# Patient Record
Sex: MALE | Race: WHITE | NOT HISPANIC OR LATINO | URBAN - METROPOLITAN AREA
[De-identification: names, ages, dates, MRNs, and addresses within clinical notes are randomized per-mention and may not be internally consistent; named-entity substitution may affect disease eponyms.]

---

## 2024-05-17 ENCOUNTER — APPOINTMENT (OUTPATIENT)
Dept: PRIMARY CARE | Facility: CLINIC | Age: 39
End: 2024-05-17
Payer: COMMERCIAL

## 2024-05-28 ENCOUNTER — APPOINTMENT (OUTPATIENT)
Dept: PRIMARY CARE | Facility: CLINIC | Age: 39
End: 2024-05-28
Payer: COMMERCIAL

## 2024-06-05 ENCOUNTER — TELEPHONE (OUTPATIENT)
Dept: PRIMARY CARE | Facility: CLINIC | Age: 39
End: 2024-06-05
Payer: COMMERCIAL

## 2024-06-05 NOTE — PROGRESS NOTES
Subjective   Aldair Lai is a 38 y.o. male who presents for SICK VISIT FOR KNEE PAIN (PCP: Dr. Reyna).     HPI:      38 y.o. male who presents for SICK VISIT FOR KNEE PAIN (PCP: Dr. Reyna).       Today  Aldair reports:    -  intermittent right knee pain, rated in severity as 5/10, not worsening over time. He denies fevers/chills, rash, swollen or red knee joint or other joints, history of knee injury, or family history of RA or rheumatologic disorders.    -intermittent heart palpitations and rapid heart racing heart  twice in the past 6 months. He denies chest pain, N/V, SOB, cough, exertional dyspnea, LE swelling, family history heart attack or stroke.     -otherwise doing and feeling well.     -taking all medications as prescribed with no reported adverse medication side effects      Today he has no other reported complaints, issues, or problems.    ROS is NEG for HEADACHE, NAUSEA, VOMITING, DIARRHEA, CHEST PAIN, SOB, and BLEEDING.  Review of systems (10+) is negative for all systems except for any identified issues in HPI above.      IMMUNIZATIONS:  TDAP: NOW DUE  Immunization History   Administered Date(s) Administered    Pfizer Purple Cap SARS-CoV-2 02/11/2021, 03/17/2021, 11/12/2021         Objective     BP (!) 154/98   Pulse 80   Temp 37.1 °C (98.7 °F)   Wt 95.1 kg (209 lb 9.6 oz)   SpO2 98%      Physical Examination:       GENERAL           General Appearance: well-appearing, well-developed, well-hydrated, well-nourished, no acute distress.        HEENT           NECK supple, no masses or thyromegaly, no carotid bruit.        EYES           Extraocular Movements: normal, bilateral eyes YOLETTE, no conjunctival injection.        HEART           Rate and Rhythm regular rate and rhythm. Heart sounds: normal S1S2, no S3 or S4. Murmurs: none.        CHEST           Breath sounds: Clear to IPPA, RR<16 no use of accessory muscles.        ABDOMEN           General: Neg for LKKS or masses, no scleral icterus or  jaundice.        MUSCULOSKELETAL           Joints Demonstration: Neg for erythema, swelling or joint deformities. gross abnormalities no gross abnormalities. RIGHT KNEE: non-tender to palpation, no erythema, no swelling. Full ROM. Negative Lachman's and anterior drawer.       EXTREMITIES           Lower Extremities: Neg for cyanosis, clubbing or edema.       Assessment/Plan   Problem List Items Addressed This Visit    None  Visit Diagnoses       Knee pain, unspecified chronicity, unspecified laterality    -  Primary    Encounter for immunization              RIGHT Knee pain: no red flag signs/sxs: r/o structural and rheumatologic disorder  -RIGHT knee XR ordered  -ESR, CRP, uric acid, CAROLEE, RF ordered  -referral to PT ordered  -referral to orthopedic surgery ordered  -emergency Dept precautions discussed and reviewed with patient    Intermittent heart palpitations: no red flag symptoms. R/o cardiac etiology  -TSH, CBC, CMP ordered  -referral to cardiology  -Emergency Room as 911/EMS precautions discussed and reviewed    UNCONTROLLED HTN: 154/98==> 150/98 manual repeat ; asymptomatic today  -low salt diet, regularly exercise, and limit alcohol intake  -start amlodipine 5 mg daily in the morning  -Emergency Room as 911/EMS precautions discussed and reviewed      Immunization counseling:  -TDAP now due: declined    FOLLOW UP : 1 week for BP check and Dr. Reyna in 6-8 weeks       Payton Forde MD, PhD

## 2024-06-05 NOTE — TELEPHONE ENCOUNTER
Pt lvm stating the knee pain is more secondary. Pt states he is being seen more about potential anxiety

## 2024-06-06 ENCOUNTER — OFFICE VISIT (OUTPATIENT)
Dept: PRIMARY CARE | Facility: CLINIC | Age: 39
End: 2024-06-06
Payer: COMMERCIAL

## 2024-06-06 VITALS
DIASTOLIC BLOOD PRESSURE: 98 MMHG | SYSTOLIC BLOOD PRESSURE: 150 MMHG | HEART RATE: 80 BPM | TEMPERATURE: 98.7 F | OXYGEN SATURATION: 98 % | WEIGHT: 209.6 LBS

## 2024-06-06 DIAGNOSIS — Z23 ENCOUNTER FOR IMMUNIZATION: ICD-10-CM

## 2024-06-06 DIAGNOSIS — R00.2 HEART PALPITATIONS: ICD-10-CM

## 2024-06-06 DIAGNOSIS — I10 PRIMARY HYPERTENSION: ICD-10-CM

## 2024-06-06 DIAGNOSIS — M25.569 KNEE PAIN, UNSPECIFIED CHRONICITY, UNSPECIFIED LATERALITY: Primary | ICD-10-CM

## 2024-06-06 PROCEDURE — 3077F SYST BP >= 140 MM HG: CPT | Performed by: FAMILY MEDICINE

## 2024-06-06 PROCEDURE — 3080F DIAST BP >= 90 MM HG: CPT | Performed by: FAMILY MEDICINE

## 2024-06-06 PROCEDURE — 1036F TOBACCO NON-USER: CPT | Performed by: FAMILY MEDICINE

## 2024-06-06 PROCEDURE — 99214 OFFICE O/P EST MOD 30 MIN: CPT | Performed by: FAMILY MEDICINE

## 2024-06-06 RX ORDER — AMLODIPINE BESYLATE 5 MG/1
5 TABLET ORAL DAILY
Qty: 30 TABLET | Refills: 11 | Status: SHIPPED | OUTPATIENT
Start: 2024-06-06 | End: 2025-06-06

## 2024-06-06 ASSESSMENT — PATIENT HEALTH QUESTIONNAIRE - PHQ9
1. LITTLE INTEREST OR PLEASURE IN DOING THINGS: NOT AT ALL
2. FEELING DOWN, DEPRESSED OR HOPELESS: NOT AT ALL
SUM OF ALL RESPONSES TO PHQ9 QUESTIONS 1 AND 2: 0

## 2024-06-06 ASSESSMENT — COLUMBIA-SUICIDE SEVERITY RATING SCALE - C-SSRS
2. HAVE YOU ACTUALLY HAD ANY THOUGHTS OF KILLING YOURSELF?: NO
6. HAVE YOU EVER DONE ANYTHING, STARTED TO DO ANYTHING, OR PREPARED TO DO ANYTHING TO END YOUR LIFE?: NO
1. IN THE PAST MONTH, HAVE YOU WISHED YOU WERE DEAD OR WISHED YOU COULD GO TO SLEEP AND NOT WAKE UP?: NO

## 2024-06-06 ASSESSMENT — ANXIETY QUESTIONNAIRES
6. BECOMING EASILY ANNOYED OR IRRITABLE: NOT AT ALL
2. NOT BEING ABLE TO STOP OR CONTROL WORRYING: SEVERAL DAYS
3. WORRYING TOO MUCH ABOUT DIFFERENT THINGS: SEVERAL DAYS
7. FEELING AFRAID AS IF SOMETHING AWFUL MIGHT HAPPEN: NOT AT ALL
IF YOU CHECKED OFF ANY PROBLEMS ON THIS QUESTIONNAIRE, HOW DIFFICULT HAVE THESE PROBLEMS MADE IT FOR YOU TO DO YOUR WORK, TAKE CARE OF THINGS AT HOME, OR GET ALONG WITH OTHER PEOPLE: SOMEWHAT DIFFICULT
1. FEELING NERVOUS, ANXIOUS, OR ON EDGE: SEVERAL DAYS
GAD7 TOTAL SCORE: 5
5. BEING SO RESTLESS THAT IT IS HARD TO SIT STILL: SEVERAL DAYS
4. TROUBLE RELAXING: SEVERAL DAYS

## 2024-06-06 ASSESSMENT — PAIN SCALES - GENERAL: PAINLEVEL: 2

## 2024-06-06 NOTE — PATIENT INSTRUCTIONS
Please start amlodipine 5 mg daily in morning and follow a low salt diet, regularly exercise, and limit alcohol intake.    Please have your labs drawn at [facility], [place]. You can walk into radiology to complete knee xrays    Please call and schedule to see cardiology Dr. Vallabehini    Please schedule to see in 1 week for blood pressure check    If you develop chest pain, heart palpitations, or pass out immediately call [phone]

## 2024-06-10 ENCOUNTER — LAB (OUTPATIENT)
Dept: LAB | Facility: LAB | Age: 39
End: 2024-06-10
Payer: COMMERCIAL

## 2024-06-10 DIAGNOSIS — R00.2 HEART PALPITATIONS: ICD-10-CM

## 2024-06-10 DIAGNOSIS — M25.569 KNEE PAIN, UNSPECIFIED CHRONICITY, UNSPECIFIED LATERALITY: ICD-10-CM

## 2024-06-10 LAB
ALBUMIN SERPL-MCNC: 4.8 G/DL (ref 3.5–5)
ALP BLD-CCNC: 64 U/L (ref 35–125)
ALT SERPL-CCNC: 14 U/L (ref 5–40)
ANION GAP SERPL CALC-SCNC: 18 MMOL/L
AST SERPL-CCNC: 15 U/L (ref 5–40)
BASOPHILS # BLD AUTO: 0.06 X10*3/UL (ref 0–0.1)
BASOPHILS NFR BLD AUTO: 0.7 %
BILIRUB SERPL-MCNC: 1.1 MG/DL (ref 0.1–1.2)
BUN SERPL-MCNC: 11 MG/DL (ref 8–25)
CALCIUM SERPL-MCNC: 9.8 MG/DL (ref 8.5–10.4)
CHLORIDE SERPL-SCNC: 103 MMOL/L (ref 97–107)
CO2 SERPL-SCNC: 23 MMOL/L (ref 24–31)
CREAT SERPL-MCNC: 0.9 MG/DL (ref 0.4–1.6)
CRP SERPL-MCNC: <0.3 MG/DL (ref 0–2)
EGFRCR SERPLBLD CKD-EPI 2021: >90 ML/MIN/1.73M*2
EOSINOPHIL # BLD AUTO: 0.06 X10*3/UL (ref 0–0.7)
EOSINOPHIL NFR BLD AUTO: 0.7 %
ERYTHROCYTE [DISTWIDTH] IN BLOOD BY AUTOMATED COUNT: 12.9 % (ref 11.5–14.5)
ERYTHROCYTE [SEDIMENTATION RATE] IN BLOOD BY WESTERGREN METHOD: 4 MM/H (ref 0–15)
GLUCOSE SERPL-MCNC: 90 MG/DL (ref 65–99)
HCT VFR BLD AUTO: 42.3 % (ref 41–52)
HGB BLD-MCNC: 14 G/DL (ref 13.5–17.5)
IMM GRANULOCYTES # BLD AUTO: 0.02 X10*3/UL (ref 0–0.7)
IMM GRANULOCYTES NFR BLD AUTO: 0.2 % (ref 0–0.9)
LYMPHOCYTES # BLD AUTO: 3.01 X10*3/UL (ref 1.2–4.8)
LYMPHOCYTES NFR BLD AUTO: 35.4 %
MCH RBC QN AUTO: 28.6 PG (ref 26–34)
MCHC RBC AUTO-ENTMCNC: 33.1 G/DL (ref 32–36)
MCV RBC AUTO: 87 FL (ref 80–100)
MONOCYTES # BLD AUTO: 0.59 X10*3/UL (ref 0.1–1)
MONOCYTES NFR BLD AUTO: 6.9 %
NEUTROPHILS # BLD AUTO: 4.77 X10*3/UL (ref 1.2–7.7)
NEUTROPHILS NFR BLD AUTO: 56.1 %
NRBC BLD-RTO: 0 /100 WBCS (ref 0–0)
PLATELET # BLD AUTO: 281 X10*3/UL (ref 150–450)
POTASSIUM SERPL-SCNC: 4 MMOL/L (ref 3.4–5.1)
PROT SERPL-MCNC: 7.5 G/DL (ref 5.9–7.9)
RBC # BLD AUTO: 4.89 X10*6/UL (ref 4.5–5.9)
SODIUM SERPL-SCNC: 144 MMOL/L (ref 133–145)
TSH SERPL DL<=0.05 MIU/L-ACNC: 2.7 MIU/L (ref 0.27–4.2)
URATE SERPL-MCNC: 5 MG/DL (ref 3.6–7.7)
WBC # BLD AUTO: 8.5 X10*3/UL (ref 4.4–11.3)

## 2024-06-10 PROCEDURE — 36415 COLL VENOUS BLD VENIPUNCTURE: CPT

## 2024-06-10 PROCEDURE — 86038 ANTINUCLEAR ANTIBODIES: CPT

## 2024-06-10 PROCEDURE — 85025 COMPLETE CBC W/AUTO DIFF WBC: CPT

## 2024-06-10 PROCEDURE — 85652 RBC SED RATE AUTOMATED: CPT

## 2024-06-10 PROCEDURE — 84443 ASSAY THYROID STIM HORMONE: CPT

## 2024-06-10 PROCEDURE — 84550 ASSAY OF BLOOD/URIC ACID: CPT

## 2024-06-10 PROCEDURE — 80053 COMPREHEN METABOLIC PANEL: CPT

## 2024-06-10 PROCEDURE — 86140 C-REACTIVE PROTEIN: CPT

## 2024-06-10 PROCEDURE — 86431 RHEUMATOID FACTOR QUANT: CPT

## 2024-06-11 LAB — RHEUMATOID FACT SER NEPH-ACNC: <10 IU/ML (ref 0–15)

## 2024-06-12 LAB — ANA SER QL HEP2 SUBST: NEGATIVE

## 2024-06-19 NOTE — PATIENT INSTRUCTIONS
Please increase amlodipine from 5 mg to 10 mg daily in morning and follow a low salt diet, regularly exercise, and limit alcohol intake.     You can walk into radiology to complete knee xrays     If you develop chest pain, heart palpitations, or pass out immediately call 911    Please schedule to see  me in 1 week for BP check and Dr. Reyna in 6-8 weeks for follow up

## 2024-06-19 NOTE — PROGRESS NOTES
Subjective   Aldair Lai is a 38 y.o. male who presents for HTN and BP CHECK (PCP: Dr. Reyna).    HPI:    38 y.o. male who presents for HTN and BP CHECK  (PCP: Dr. Reyna)..     EMR/[facility] records reviewed.    Last seen by me 6/6/24 for SICK VISIT for RIGHT KNEE PAIN. At visit:    UNCONTROLLED HTN: 154/98==> 150/98 manual repeat ; asymptomatic today  -low salt diet, regularly exercise, and limit alcohol intake  -start amlodipine 5 mg daily in the morning  -Emergency Room as 911/EMS precautions discussed and reviewed    RIGHT Knee pain: no red flag signs/sxs: r/o structural and rheumatologic disorder  -RIGHT knee XR ordered  -ESR, CRP, uric acid, CAROLEE, RF ordered  -referral to PT ordered  -referral to orthopedic surgery ordered  -emergency Dept precautions discussed and reviewed with patient     Intermittent heart palpitations: no red flag symptoms. R/o cardiac etiology  -TSH, CBC, CMP ordered  -referral to cardiology  -Emergency Room as 911/EMS precautions discussed and reviewed       Immunization counseling:  -TDAP now due: declined     FOLLOW UP : 1 week for BP check and Dr. Reyna in 6-8 weeks      PMHx:  HTN; on amlodipine 5 mg daily    Immunization History   Administered Date(s) Administered    Pfizer Purple Cap SARS-CoV-2 02/11/2021, 03/17/2021, 11/12/2021           INTERVAL HISTORY:    -has not yet completed knee xray    -completed labs. Based on test results:  -normal glucose, electrolytes, kidney and liver function     -CRP, ESR, rheumatoid factor, uric acid normal     -thyroid function normal     -blood counts normal      Today  Aldair reports:     -  intermittent right knee pain, rated in severity as 5/10, not worsening over time, UNCHANGED SINCE LAST VISIT. He denies fevers/chills, rash, swollen or red knee joint or other joints, history of knee injury, or family history of RA or rheumatologic disorders.     -intermittent heart palpitations and rapid heart racing heart  twice in the past 6 months, with no  "new episodes since last visit. He denies chest pain, N/V, SOB, cough, exertional dyspnea, LE swelling, family history heart attack or stroke.     -has scheduled cardiology appt 7/23/24     -otherwise doing and feeling well.      -taking all medications as prescribed, including amlodipine 5 mg daily, with no reported adverse medication side effects    -following a low salt diet        Today he has no other reported complaints, issues, or problems.     ROS is NEG for HEADACHE, NAUSEA, VOMITING, DIARRHEA, CHEST PAIN, SOB, and BLEEDING.  Review of systems (10+) is negative for all systems except for any identified issues in HPI above.         Objective     /90   Pulse 80   Temp 36.9 °C (98.4 °F)   Ht 1.854 m (6' 1\")   Wt 92.7 kg (204 lb 6.4 oz)   SpO2 97%   BMI 26.97 kg/m²      Physical Examination:       GENERAL           General Appearance: well-appearing, well-developed, well-hydrated, well-nourished, no acute distress.        HEENT           NECK supple, no masses or thyromegaly, no carotid bruit.        EYES           Extraocular Movements: normal, bilateral eyes YOLETTE, no conjunctival injection.        HEART           Rate and Rhythm regular rate and rhythm. Heart sounds: normal S1S2, no S3 or S4. Murmurs: none.        CHEST           Breath sounds: Clear to IPPA, RR<16 no use of accessory muscles.        ABDOMEN           General: Neg for LKKS or masses, no scleral icterus or jaundice.        MUSCULOSKELETAL           Joints Demonstration: Neg for erythema, swelling or joint deformities. gross abnormalities no gross abnormalities.        EXTREMITIES           Lower Extremities: Neg for cyanosis, clubbing or edema.       Assessment/Plan   Problem List Items Addressed This Visit    None  Visit Diagnoses       Primary hypertension    -  Primary    Relevant Medications    amLODIPine (Norvasc) 10 mg tablet    Heart palpitations        Knee pain, unspecified chronicity, unspecified laterality          "     HTN:  uncontrolled, asymptomatic   -low salt diet, regularly exercise, and limit alcohol intake  -increase amlodipine from 5 mg to 10 mg daily in the morning  -Emergency Room as 911/EMS precautions discussed and reviewed    RIGHT Knee pain: no red flag signs/sxs: ESR, CRP, uric acid, CAROLEE, RF WNL 6/10/24. r/o structural and rheumatologic disorder  -RIGHT knee XR ordered 6/6/24  -referral to PT ordered 6/6/24  -referral to orthopedic surgery ordered 6/6/24  -emergency Dept precautions discussed and reviewed with patient     Intermittent heart palpitations: no red flag symptoms. CBC, CMP, TSH WNL 6/10/24. R/o cardiac etiology  -referral to cardiology ordered 6/6/24; has scheduled cardiology appt  -Emergency Room as 911/EMS precautions discussed and reviewed        Immunization counseling:  -TDAP now due: declined     FOLLOW UP : 1 week for BP check and Dr. Reyna in 6-8 weeks         Payton Forde MD, PhD

## 2024-06-20 ENCOUNTER — OFFICE VISIT (OUTPATIENT)
Dept: PRIMARY CARE | Facility: CLINIC | Age: 39
End: 2024-06-20
Payer: COMMERCIAL

## 2024-06-20 VITALS
DIASTOLIC BLOOD PRESSURE: 90 MMHG | HEIGHT: 73 IN | HEART RATE: 80 BPM | OXYGEN SATURATION: 97 % | BODY MASS INDEX: 27.09 KG/M2 | TEMPERATURE: 98.4 F | SYSTOLIC BLOOD PRESSURE: 146 MMHG | WEIGHT: 204.4 LBS

## 2024-06-20 DIAGNOSIS — M25.569 KNEE PAIN, UNSPECIFIED CHRONICITY, UNSPECIFIED LATERALITY: ICD-10-CM

## 2024-06-20 DIAGNOSIS — I10 PRIMARY HYPERTENSION: Primary | ICD-10-CM

## 2024-06-20 DIAGNOSIS — R00.2 HEART PALPITATIONS: ICD-10-CM

## 2024-06-20 PROCEDURE — 3077F SYST BP >= 140 MM HG: CPT | Performed by: FAMILY MEDICINE

## 2024-06-20 PROCEDURE — 1036F TOBACCO NON-USER: CPT | Performed by: FAMILY MEDICINE

## 2024-06-20 PROCEDURE — 3080F DIAST BP >= 90 MM HG: CPT | Performed by: FAMILY MEDICINE

## 2024-06-20 PROCEDURE — 99214 OFFICE O/P EST MOD 30 MIN: CPT | Performed by: FAMILY MEDICINE

## 2024-06-20 RX ORDER — AMLODIPINE BESYLATE 10 MG/1
10 TABLET ORAL DAILY
Qty: 30 TABLET | Refills: 11 | Status: SHIPPED | OUTPATIENT
Start: 2024-06-20 | End: 2025-06-20

## 2024-06-20 ASSESSMENT — PAIN SCALES - GENERAL: PAINLEVEL: 0-NO PAIN

## 2024-06-20 ASSESSMENT — COLUMBIA-SUICIDE SEVERITY RATING SCALE - C-SSRS
2. HAVE YOU ACTUALLY HAD ANY THOUGHTS OF KILLING YOURSELF?: NO
1. IN THE PAST MONTH, HAVE YOU WISHED YOU WERE DEAD OR WISHED YOU COULD GO TO SLEEP AND NOT WAKE UP?: NO
6. HAVE YOU EVER DONE ANYTHING, STARTED TO DO ANYTHING, OR PREPARED TO DO ANYTHING TO END YOUR LIFE?: NO

## 2024-07-01 ENCOUNTER — APPOINTMENT (OUTPATIENT)
Dept: PRIMARY CARE | Facility: CLINIC | Age: 39
End: 2024-07-01
Payer: COMMERCIAL

## 2024-07-07 NOTE — PROGRESS NOTES
Subjective   Aldair Lai is a 38 y.o. male who presents for HTN and BP CHECK (PCP Dr. Reyna).    HPI:    38 y.o. male who presents for HTN and BP CHECK    [facility] EMR/EPIC records reviewed.    Last seen by me on 6/20/24 for  HTN and BP CHECK (PCP: Dr. Reyna). At visit:    HTN:  uncontrolled, asymptomatic   -low salt diet, regularly exercise, and limit alcohol intake  -increase amlodipine from 5 mg to 10 mg daily in the morning  -Emergency Room as 911/EMS precautions discussed and reviewed     RIGHT Knee pain: no red flag signs/sxs: ESR, CRP, uric acid, CAROLEE, RF WNL 6/10/24. r/o structural and rheumatologic disorder  -RIGHT knee XR ordered 6/6/24  -referral to PT ordered 6/6/24  -referral to orthopedic surgery ordered 6/6/24  -emergency Dept precautions discussed and reviewed with patient     Intermittent heart palpitations: no red flag symptoms. CBC, CMP, TSH WNL 6/10/24. R/o cardiac etiology  -referral to cardiology ordered 6/6/24; has scheduled cardiology appt  -Emergency Room as 911/EMS precautions discussed and reviewed        Immunization counseling:  -TDAP now due: declined     FOLLOW UP : 1 week for BP check and Dr. Reyna in 6-8 weeks        Also seen by me 6/6/24 for SICK VISIT for RIGHT KNEE PAIN. At visit:     UNCONTROLLED HTN: 154/98==> 150/98 manual repeat ; asymptomatic today  -low salt diet, regularly exercise, and limit alcohol intake  -start amlodipine 5 mg daily in the morning  -Emergency Room as 911/EMS precautions discussed and reviewed     RIGHT Knee pain: no red flag signs/sxs: r/o structural and rheumatologic disorder  -RIGHT knee XR ordered  -ESR, CRP, uric acid, CAROLEE, RF ordered  -referral to PT ordered  -referral to orthopedic surgery ordered  -emergency Dept precautions discussed and reviewed with patient     Intermittent heart palpitations: no red flag symptoms. R/o cardiac etiology  -TSH, CBC, CMP ordered  -referral to cardiology  -Emergency Room as 911/EMS precautions discussed and  reviewed        Immunization counseling:  -TDAP now due: declined     FOLLOW UP : 1 week for BP check and Dr. Reyna in 6-8 weeks        PMHx:  HTN; on amlodipine 10 mg daily     Immunization History   Administered Date(s) Administered    Pfizer Purple Cap SARS-CoV-2 02/11/2021, 03/17/2021, 11/12/2021                   INTERVAL HISTORY:     -has not yet completed RIGHT knee xray        Today  Aldair reports:        -intermittent heart palpitations and rapid heart racing heart  twice in the past 6 months, with no new episodes since last visit. He denies chest pain, N/V, SOB, cough, exertional dyspnea, LE swelling, family history heart attack or stroke.      -has scheduled cardiology appt 7/23/24    -  intermittent right knee pain, rated in severity as 5/10, not worsening over time, UNCHANGED SINCE LAST VISIT. He denies fevers/chills, rash, swollen or red knee joint or other joints, history of knee injury, or family history of RA or rheumatologic disorders.  He reports that he has not yet completed right knee XR but he will.      -otherwise doing and feeling well.      -taking all medications as prescribed, including amlodipine 10 mg daily, with no reported adverse medication side effects     -following a low salt diet        Today he has no other reported complaints, issues, or problems.     ROS is NEG for HEADACHE, NAUSEA, VOMITING, DIARRHEA, CHEST PAIN, SOB, and BLEEDING.  Review of systems (12 point) is negative for all systems except for any identified issues in HPI above.        Objective     There were no vitals taken for this visit.     Physical Examination:       GENERAL           General Appearance: well-appearing, well-developed, well-hydrated, well-nourished, no acute distress.        HEENT           NECK supple, no masses or thyromegaly, no carotid bruit.        EYES           Extraocular Movements: normal, bilateral eyes YOLETTE, no conjunctival injection.        HEART           Rate and Rhythm regular rate  and rhythm. Heart sounds: normal S1S2, no S3 or S4. Murmurs: none.        CHEST           Breath sounds: Clear to IPPA, RR<16 no use of accessory muscles.        ABDOMEN           General: Neg for LKKS or masses, no scleral icterus or jaundice.        MUSCULOSKELETAL           Joints Demonstration: Neg for erythema, swelling or joint deformities. gross abnormalities no gross abnormalities.        EXTREMITIES           Lower Extremities: Neg for cyanosis, clubbing or edema.       Assessment/Plan   Problem List Items Addressed This Visit    None    HTN:    -low salt diet, regularly exercise, and limit alcohol intake  -cont amlodipine from 10 mg daily in the morning  -Emergency Room as 911/EMS precautions discussed and reviewed     RIGHT Knee pain: no red flag signs/sxs: ESR, CRP, uric acid, CAROLEE, RF WNL 6/10/24. r/o structural and rheumatologic disorder  -RIGHT knee XR ordered 6/6/24  -referral to PT ordered 6/6/24  -referral to orthopedic surgery ordered 6/6/24  -emergency Dept precautions discussed and reviewed with patient     Intermittent heart palpitations: no red flag symptoms. CBC, CMP, TSH WNL 6/10/24. R/o cardiac etiology  -referral to cardiology ordered 6/6/24; has scheduled cardiology appt  -Emergency Room as 911/EMS precautions discussed and reviewed        Immunization counseling:  -TDAP now due: declined     FOLLOW UP : Dr. Reyna in 6-8 weeks         Payton Forde MD, PhD

## 2024-07-09 ENCOUNTER — APPOINTMENT (OUTPATIENT)
Dept: PRIMARY CARE | Facility: CLINIC | Age: 39
End: 2024-07-09
Payer: COMMERCIAL

## 2024-07-09 DIAGNOSIS — I10 PRIMARY HYPERTENSION: Primary | ICD-10-CM

## 2024-07-09 DIAGNOSIS — Z71.85 IMMUNIZATION COUNSELING: ICD-10-CM

## 2024-07-09 DIAGNOSIS — M25.569 KNEE PAIN, UNSPECIFIED CHRONICITY, UNSPECIFIED LATERALITY: ICD-10-CM

## 2024-07-09 DIAGNOSIS — R00.2 HEART PALPITATIONS: ICD-10-CM

## 2024-07-17 NOTE — PROGRESS NOTES
Subjective   Aldair Lai is a 38 y.o. male who presents for HTN and BP CHECK (PCP Dr. Reyna).    HPI:    38 y.o. male who presents for HTN and BP CHECK    [facility] EMR/EPIC records reviewed.    Last seen by me on 6/20/24 for  HTN and BP CHECK (PCP: Dr. Reyna). At visit:    HTN:  uncontrolled, asymptomatic   -low salt diet, regularly exercise, and limit alcohol intake  -increase amlodipine from 5 mg to 10 mg daily in the morning  -Emergency Room as 911/EMS precautions discussed and reviewed     RIGHT Knee pain: no red flag signs/sxs: ESR, CRP, uric acid, CAROLEE, RF WNL 6/10/24. r/o structural and rheumatologic disorder  -RIGHT knee XR ordered 6/6/24  -referral to PT ordered 6/6/24  -referral to orthopedic surgery ordered 6/6/24  -emergency Dept precautions discussed and reviewed with patient     Intermittent heart palpitations: no red flag symptoms. CBC, CMP, TSH WNL 6/10/24. R/o cardiac etiology  -referral to cardiology ordered 6/6/24; has scheduled cardiology appt  -Emergency Room as 911/EMS precautions discussed and reviewed        Immunization counseling:  -TDAP now due: declined     FOLLOW UP : 1 week for BP check and Dr. Reyna in 6-8 weeks        Also seen by me 6/6/24 for SICK VISIT for RIGHT KNEE PAIN. At visit:     UNCONTROLLED HTN: 154/98==> 150/98 manual repeat ; asymptomatic today  -low salt diet, regularly exercise, and limit alcohol intake  -start amlodipine 5 mg daily in the morning  -Emergency Room as 911/EMS precautions discussed and reviewed     RIGHT Knee pain: no red flag signs/sxs: r/o structural and rheumatologic disorder  -RIGHT knee XR ordered  -ESR, CRP, uric acid, CAROLEE, RF ordered  -referral to PT ordered  -referral to orthopedic surgery ordered  -emergency Dept precautions discussed and reviewed with patient     Intermittent heart palpitations: no red flag symptoms. R/o cardiac etiology  -TSH, CBC, CMP ordered  -referral to cardiology  -Emergency Room as 911/EMS precautions discussed and  reviewed        Immunization counseling:  -TDAP now due: declined     FOLLOW UP : 1 week for BP check and Dr. Reyna in 6-8 weeks        PMHx:  HTN; on amlodipine 10 mg daily     Immunization History   Administered Date(s) Administered    Pfizer Purple Cap SARS-CoV-2 02/11/2021, 03/17/2021, 11/12/2021             INTERVAL HISTORY:     -has not yet completed RIGHT knee xray        Today  Aldair reports:        -intermittent heart palpitations and rapid heart racing heart  twice in the past 6 months, with no new episodes since last visit. He denies chest pain, N/V, SOB, cough, exertional dyspnea, LE swelling, family history heart attack or stroke.      -has scheduled cardiology appt 7/23/24 for evaluation of heart palpitations    -  intermittent right knee pain, rated in severity as 5/10, not worsening over time, UNCHANGED SINCE LAST VISIT. He denies fevers/chills, rash, swollen or red knee joint or other joints, history of knee injury, or family history of RA or rheumatologic disorders.  He reports that he has not yet completed right knee XR but he will.      -otherwise doing and feeling well.      -taking all medications as prescribed, including amlodipine 10 mg daily, with no reported adverse medication side effects     -following a low salt diet        Today he has no other reported complaints, issues, or problems.     ROS is NEG for HEADACHE, NAUSEA, VOMITING, DIARRHEA, CHEST PAIN, SOB, and BLEEDING.  Review of systems (12 point) is negative for all systems except for any identified issues in HPI above.        Objective     There were no vitals taken for this visit.     Physical Examination:       GENERAL           General Appearance: well-appearing, well-developed, well-hydrated, well-nourished, no acute distress.        HEENT           NECK supple, no masses or thyromegaly, no carotid bruit.        EYES           Extraocular Movements: normal, bilateral eyes YOLETTE, no conjunctival injection.        HEART            Rate and Rhythm regular rate and rhythm. Heart sounds: normal S1S2, no S3 or S4. Murmurs: none.        CHEST           Breath sounds: Clear to IPPA, RR<16 no use of accessory muscles.        ABDOMEN           General: Neg for LKKS or masses, no scleral icterus or jaundice.        MUSCULOSKELETAL           Joints Demonstration: Neg for erythema, swelling or joint deformities. gross abnormalities no gross abnormalities.        EXTREMITIES           Lower Extremities: Neg for cyanosis, clubbing or edema.       Assessment/Plan   Problem List Items Addressed This Visit    None    HTN:    -low salt diet, regularly exercise, and limit alcohol intake  -cont amlodipine from 10 mg daily in the morning  -Emergency Room as 911/EMS precautions discussed and reviewed     RIGHT Knee pain: no red flag signs/sxs: ESR, CRP, uric acid, CAROLEE, RF WNL 6/10/24. r/o structural and rheumatologic disorder  -RIGHT knee XR ordered 6/6/24  -referral to PT ordered 6/6/24  -referral to orthopedic surgery ordered 6/6/24  -emergency Dept precautions discussed and reviewed with patient     Intermittent heart palpitations: no red flag symptoms. CBC, CMP, TSH WNL 6/10/24. R/o cardiac etiology  -referral to cardiology ordered 6/6/24; has scheduled cardiology appt  -Emergency Room as 911/EMS precautions discussed and reviewed        Immunization counseling:  -TDAP now due: declined     FOLLOW UP : Dr. Reyna in 6-8 weeks         Payton Forde MD, PhD

## 2024-07-18 ENCOUNTER — APPOINTMENT (OUTPATIENT)
Dept: PRIMARY CARE | Facility: CLINIC | Age: 39
End: 2024-07-18
Payer: COMMERCIAL

## 2024-07-18 DIAGNOSIS — I10 PRIMARY HYPERTENSION: Primary | ICD-10-CM

## 2024-07-18 DIAGNOSIS — R00.2 HEART PALPITATIONS: ICD-10-CM

## 2024-07-18 DIAGNOSIS — M25.569 KNEE PAIN, UNSPECIFIED CHRONICITY, UNSPECIFIED LATERALITY: ICD-10-CM

## 2024-07-18 DIAGNOSIS — Z71.85 IMMUNIZATION COUNSELING: ICD-10-CM

## 2024-07-23 ENCOUNTER — OFFICE VISIT (OUTPATIENT)
Dept: CARDIOLOGY | Facility: CLINIC | Age: 39
End: 2024-07-23
Payer: COMMERCIAL

## 2024-07-23 VITALS
DIASTOLIC BLOOD PRESSURE: 80 MMHG | WEIGHT: 202.3 LBS | OXYGEN SATURATION: 99 % | SYSTOLIC BLOOD PRESSURE: 138 MMHG | HEART RATE: 79 BPM | BODY MASS INDEX: 26.81 KG/M2 | HEIGHT: 73 IN

## 2024-07-23 DIAGNOSIS — R00.2 HEART PALPITATIONS: ICD-10-CM

## 2024-07-23 DIAGNOSIS — I10 HYPERTENSION, UNSPECIFIED TYPE: Primary | ICD-10-CM

## 2024-07-23 PROCEDURE — 93010 ELECTROCARDIOGRAM REPORT: CPT | Performed by: INTERNAL MEDICINE

## 2024-07-23 PROCEDURE — 99213 OFFICE O/P EST LOW 20 MIN: CPT | Mod: 25 | Performed by: INTERNAL MEDICINE

## 2024-07-23 PROCEDURE — 93005 ELECTROCARDIOGRAM TRACING: CPT | Performed by: INTERNAL MEDICINE

## 2024-07-23 PROCEDURE — 3079F DIAST BP 80-89 MM HG: CPT | Performed by: INTERNAL MEDICINE

## 2024-07-23 PROCEDURE — 3075F SYST BP GE 130 - 139MM HG: CPT | Performed by: INTERNAL MEDICINE

## 2024-07-23 PROCEDURE — 3008F BODY MASS INDEX DOCD: CPT | Performed by: INTERNAL MEDICINE

## 2024-07-23 PROCEDURE — 1036F TOBACCO NON-USER: CPT | Performed by: INTERNAL MEDICINE

## 2024-07-23 PROCEDURE — 99203 OFFICE O/P NEW LOW 30 MIN: CPT | Performed by: INTERNAL MEDICINE

## 2024-07-23 RX ORDER — OMEPRAZOLE 20 MG/1
TABLET, DELAYED RELEASE ORAL EVERY 24 HOURS
COMMUNITY

## 2024-07-23 ASSESSMENT — ENCOUNTER SYMPTOMS
DEPRESSION: 0
OCCASIONAL FEELINGS OF UNSTEADINESS: 0
LOSS OF SENSATION IN FEET: 0

## 2024-07-23 ASSESSMENT — PAIN SCALES - GENERAL: PAINLEVEL: 0-NO PAIN

## 2024-07-23 NOTE — PROGRESS NOTES
"Primary Care Physician: Pavan Reyna MD  Date of Visit: 07/23/2024  3:15 PM EDT  Location of visit: [facility]     Chief Complaint:   Chief Complaint   Patient presents with    New Patient Visit     HPI / Summary:   Aldair Lai is a 38 y.o. male presents for new patient    ROS    Medical History:   He has no past medical history on file.  Surgical Hx:   He has no past surgical history on file.   Social Hx:   He reports that he has never smoked. He has never been exposed to tobacco smoke. He has never used smokeless tobacco. He reports that he does not drink alcohol. No history on file for drug use.  Family Hx:   His family history is not on file.   Allergies:  No Known Allergies  Outpatient Medications:  Current Outpatient Medications   Medication Instructions    amLODIPine (NORVASC) 5 mg, oral, Daily    amLODIPine (NORVASC) 10 mg, oral, Daily    omeprazole OTC (PriLOSEC OTC) 20 mg EC tablet Every 24 hours     Physical Exam:  Vitals:    07/23/24 1535 07/23/24 1615   BP: 144/86 138/80   BP Location: Right arm    Patient Position: Sitting    BP Cuff Size: Large adult    Pulse: 92 79   SpO2: 99%    Weight: 91.8 kg (202 lb 4.8 oz)    Height: 1.854 m (6' 1\")      Wt Readings from Last 5 Encounters:   07/23/24 91.8 kg (202 lb 4.8 oz)   06/20/24 92.7 kg (204 lb 6.4 oz)   06/06/24 95.1 kg (209 lb 9.6 oz)     Physical Exam  JVP not elevated. Carotid impulses are 2+ without overlying bruit.   Chest exhibits fair to good air movement with completely clear breath sounds.   The cardiac rhythm is regular with no premature beats.   Normal S1 and S2. No gallop, murmur or rub, or click.   Abdomen is soft and benign without focal tenderness.   With no lower leg edema. The pedal pulses are intact.     Last Labs:  Lab on 06/10/2024   Component Date Value    Sedimentation Rate 06/10/2024 4     C-Reactive Protein 06/10/2024 <0.30     CAROLEE 06/10/2024 Negative     Rheumatoid Factor 06/10/2024 <10     Uric Acid " 06/10/2024 5.0     Thyroid Stimulating Horm* 06/10/2024 2.70     WBC 06/10/2024 8.5     nRBC 06/10/2024 0.0     RBC 06/10/2024 4.89     Hemoglobin 06/10/2024 14.0     Hematocrit 06/10/2024 42.3     MCV 06/10/2024 87     MCH 06/10/2024 28.6     MCHC 06/10/2024 33.1     RDW 06/10/2024 12.9     Platelets 06/10/2024 281     Neutrophils % 06/10/2024 56.1     Immature Granulocytes %,* 06/10/2024 0.2     Lymphocytes % 06/10/2024 35.4     Monocytes % 06/10/2024 6.9     Eosinophils % 06/10/2024 0.7     Basophils % 06/10/2024 0.7     Neutrophils Absolute 06/10/2024 4.77     Immature Granulocytes Ab* 06/10/2024 0.02     Lymphocytes Absolute 06/10/2024 3.01     Monocytes Absolute 06/10/2024 0.59     Eosinophils Absolute 06/10/2024 0.06     Basophils Absolute 06/10/2024 0.06     Glucose 06/10/2024 90     Sodium 06/10/2024 144     Potassium 06/10/2024 4.0     Chloride 06/10/2024 103     Bicarbonate 06/10/2024 23 (L)     Urea Nitrogen 06/10/2024 11     Creatinine 06/10/2024 0.90     eGFR 06/10/2024 >90     Calcium 06/10/2024 9.8     Albumin 06/10/2024 4.8     Alkaline Phosphatase 06/10/2024 64     Total Protein 06/10/2024 7.5     AST 06/10/2024 15     Bilirubin, Total 06/10/2024 1.1     ALT 06/10/2024 14     Anion Gap 06/10/2024 18         Assessment/Plan   1.  Hypertension.  Patient is a very pleasant 38-year-old white male who was diagnosed with hypertension during his initial primary care visit in 6/2024.  He was started on amlodipine 5 mg daily at that time and the dose was subsequently increased to 10 mg daily.  Lab work performed on 6/10/2024 included a normal CBC SMA panel TSH 2.70.  Additional lab work included uric acid 5.0 sedimentation rate 4 CRP less than 0.30 CAROLEE negative rheumatoid factor less than 10.  The patient's medical history is negative for diabetes and smoking.  Family history negative for known heart disease.  His lipid status is uncertain.  Clinically he feels well other than some issues with anxiety.  He  had 1 episode of minor palpitations.  Currently he is feeling relatively well.  His EKG from office visit 7/23/2024 shows sinus rhythm is unremarkable.  Blood pressure currently 138/80 indicating adequate control with the amlodipine 10 mg daily.  Patient will be advised to remain on current therapy along with some salt restriction with his diet.  He will return in 4 months to reassess his blood pressure.  He was instructed to purchase a home blood pressure monitor and to record readings and bring the machine in for next visit.  The patient will be instructed to have a fasting lipid panel drawn also before his next visit.          Orders:  Orders Placed This Encounter   Procedures    Lipid panel    ECG 12 lead (Clinic Performed)      Followup Appts:  Future Appointments   Date Time Provider Department Center   8/21/2024  8:00 AM Pavan Reyna MD [id number]   11/13/2024  1:15 PM Eligio Hobbs MD [id number]           ____________________________________________________________  Eligio Hobbs MD  [facility]  Assistant Clinical Professor, [employer]  [facility]

## 2024-07-24 LAB
ATRIAL RATE: 79 BPM
P AXIS: 52 DEGREES
P OFFSET: 197 MS
P ONSET: 134 MS
PR INTERVAL: 154 MS
Q ONSET: 211 MS
QRS COUNT: 13 BEATS
QRS DURATION: 104 MS
QT INTERVAL: 368 MS
QTC CALCULATION(BAZETT): 421 MS
QTC FREDERICIA: 403 MS
R AXIS: -9 DEGREES
T AXIS: 46 DEGREES
T OFFSET: 395 MS
VENTRICULAR RATE: 79 BPM

## 2024-08-21 ENCOUNTER — APPOINTMENT (OUTPATIENT)
Dept: PRIMARY CARE | Facility: CLINIC | Age: 39
End: 2024-08-21
Payer: COMMERCIAL

## 2024-09-18 ENCOUNTER — LAB (OUTPATIENT)
Dept: LAB | Facility: LAB | Age: 39
End: 2024-09-18
Payer: COMMERCIAL

## 2024-09-18 DIAGNOSIS — R00.2 HEART PALPITATIONS: ICD-10-CM

## 2024-09-18 DIAGNOSIS — I10 HYPERTENSION, UNSPECIFIED TYPE: ICD-10-CM

## 2024-09-18 LAB
CHOLEST SERPL-MCNC: 205 MG/DL (ref 0–199)
CHOLESTEROL/HDL RATIO: 4
HDLC SERPL-MCNC: 51.6 MG/DL
LDLC SERPL CALC-MCNC: 140 MG/DL
NON HDL CHOLESTEROL: 153 MG/DL (ref 0–149)
TRIGL SERPL-MCNC: 69 MG/DL (ref 0–149)
VLDL: 14 MG/DL (ref 0–40)

## 2024-09-18 PROCEDURE — 36415 COLL VENOUS BLD VENIPUNCTURE: CPT

## 2024-09-18 PROCEDURE — 80061 LIPID PANEL: CPT

## 2024-11-13 ENCOUNTER — OFFICE VISIT (OUTPATIENT)
Dept: CARDIOLOGY | Facility: CLINIC | Age: 39
End: 2024-11-13
Payer: COMMERCIAL

## 2024-11-13 VITALS
OXYGEN SATURATION: 99 % | WEIGHT: 192.2 LBS | DIASTOLIC BLOOD PRESSURE: 78 MMHG | BODY MASS INDEX: 25.47 KG/M2 | HEART RATE: 80 BPM | HEIGHT: 73 IN | SYSTOLIC BLOOD PRESSURE: 138 MMHG

## 2024-11-13 DIAGNOSIS — R00.2 HEART PALPITATIONS: Primary | ICD-10-CM

## 2024-11-13 DIAGNOSIS — I10 HYPERTENSION, UNSPECIFIED TYPE: ICD-10-CM

## 2024-11-13 PROCEDURE — 3075F SYST BP GE 130 - 139MM HG: CPT | Performed by: INTERNAL MEDICINE

## 2024-11-13 PROCEDURE — 3078F DIAST BP <80 MM HG: CPT | Performed by: INTERNAL MEDICINE

## 2024-11-13 PROCEDURE — 3008F BODY MASS INDEX DOCD: CPT | Performed by: INTERNAL MEDICINE

## 2024-11-13 PROCEDURE — 99213 OFFICE O/P EST LOW 20 MIN: CPT | Performed by: INTERNAL MEDICINE

## 2024-11-13 PROCEDURE — 1036F TOBACCO NON-USER: CPT | Performed by: INTERNAL MEDICINE

## 2024-11-13 ASSESSMENT — COLUMBIA-SUICIDE SEVERITY RATING SCALE - C-SSRS
6. HAVE YOU EVER DONE ANYTHING, STARTED TO DO ANYTHING, OR PREPARED TO DO ANYTHING TO END YOUR LIFE?: NO
2. HAVE YOU ACTUALLY HAD ANY THOUGHTS OF KILLING YOURSELF?: NO
1. IN THE PAST MONTH, HAVE YOU WISHED YOU WERE DEAD OR WISHED YOU COULD GO TO SLEEP AND NOT WAKE UP?: NO

## 2024-11-13 ASSESSMENT — PATIENT HEALTH QUESTIONNAIRE - PHQ9
2. FEELING DOWN, DEPRESSED OR HOPELESS: NOT AT ALL
1. LITTLE INTEREST OR PLEASURE IN DOING THINGS: NOT AT ALL
SUM OF ALL RESPONSES TO PHQ9 QUESTIONS 1 AND 2: 0

## 2024-11-13 ASSESSMENT — ENCOUNTER SYMPTOMS
OCCASIONAL FEELINGS OF UNSTEADINESS: 0
LOSS OF SENSATION IN FEET: 0
DEPRESSION: 0

## 2024-11-13 ASSESSMENT — PAIN SCALES - GENERAL: PAINLEVEL_OUTOF10: 0-NO PAIN

## 2024-11-13 NOTE — PROGRESS NOTES
Primary Care Physician: Pavan Reyna MD  Date of Visit: 11/13/2024  1:15 PM EST  Location of visit: [facility]     Chief Complaint:   No chief complaint on file.    HPI / Summary:   Aldair Lai is a 39 y.o. male presents for new patient    ROS    Medical History:   He has no past medical history on file.  Surgical Hx:   He has no past surgical history on file.   Social Hx:   He reports that he has never smoked. He has never been exposed to tobacco smoke. He has never used smokeless tobacco. He reports that he does not drink alcohol. No history on file for drug use.  Family Hx:   His family history is not on file.   Allergies:  No Known Allergies  Outpatient Medications:  Current Outpatient Medications   Medication Instructions    amLODIPine (NORVASC) 5 mg, oral, Daily    amLODIPine (NORVASC) 10 mg, oral, Daily    omeprazole OTC (PriLOSEC OTC) 20 mg EC tablet Every 24 hours     Physical Exam:  There were no vitals filed for this visit.    Wt Readings from Last 5 Encounters:   07/23/24 91.8 kg (202 lb 4.8 oz)   06/20/24 92.7 kg (204 lb 6.4 oz)   06/06/24 95.1 kg (209 lb 9.6 oz)     Physical Exam  JVP not elevated. Carotid impulses are 2+ without overlying bruit.   Chest exhibits fair to good air movement with completely clear breath sounds.   The cardiac rhythm is regular with no premature beats.   Normal S1 and S2. No gallop, murmur or rub, or click.   Abdomen is soft and benign without focal tenderness.   With no lower leg edema. The pedal pulses are intact.     Last Labs:  Lab on 09/18/2024   Component Date Value    Cholesterol 09/18/2024 205 (H)     HDL-Cholesterol 09/18/2024 51.6     Cholesterol/HDL Ratio 09/18/2024 4.0     LDL Calculated 09/18/2024 140 (H)     VLDL 09/18/2024 14     Triglycerides 09/18/2024 69     Non HDL Cholesterol 09/18/2024 153 (H)    Office Visit on 07/23/2024   Component Date Value    Ventricular Rate 07/23/2024 79     Atrial Rate 07/23/2024 79     DC Interval  07/23/2024 154     QRS Duration 07/23/2024 104     QT Interval 07/23/2024 368     QTC Calculation(Bazett) 07/23/2024 421     P Axis 07/23/2024 52     R Axis 07/23/2024 -9     T Axis 07/23/2024 46     QRS Count 07/23/2024 13     Q Onset 07/23/2024 211     P Onset 07/23/2024 134     P Offset 07/23/2024 197     T Offset 07/23/2024 395     QTC Fredericia 07/23/2024 403    Lab on 06/10/2024   Component Date Value    Sedimentation Rate 06/10/2024 4     C-Reactive Protein 06/10/2024 <0.30     CAROLEE 06/10/2024 Negative     Rheumatoid Factor 06/10/2024 <10     Uric Acid 06/10/2024 5.0     Thyroid Stimulating Horm* 06/10/2024 2.70     WBC 06/10/2024 8.5     nRBC 06/10/2024 0.0     RBC 06/10/2024 4.89     Hemoglobin 06/10/2024 14.0     Hematocrit 06/10/2024 42.3     MCV 06/10/2024 87     MCH 06/10/2024 28.6     MCHC 06/10/2024 33.1     RDW 06/10/2024 12.9     Platelets 06/10/2024 281     Neutrophils % 06/10/2024 56.1     Immature Granulocytes %,* 06/10/2024 0.2     Lymphocytes % 06/10/2024 35.4     Monocytes % 06/10/2024 6.9     Eosinophils % 06/10/2024 0.7     Basophils % 06/10/2024 0.7     Neutrophils Absolute 06/10/2024 4.77     Immature Granulocytes Ab* 06/10/2024 0.02     Lymphocytes Absolute 06/10/2024 3.01     Monocytes Absolute 06/10/2024 0.59     Eosinophils Absolute 06/10/2024 0.06     Basophils Absolute 06/10/2024 0.06     Glucose 06/10/2024 90     Sodium 06/10/2024 144     Potassium 06/10/2024 4.0     Chloride 06/10/2024 103     Bicarbonate 06/10/2024 23 (L)     Urea Nitrogen 06/10/2024 11     Creatinine 06/10/2024 0.90     eGFR 06/10/2024 >90     Calcium 06/10/2024 9.8     Albumin 06/10/2024 4.8     Alkaline Phosphatase 06/10/2024 64     Total Protein 06/10/2024 7.5     AST 06/10/2024 15     Bilirubin, Total 06/10/2024 1.1     ALT 06/10/2024 14     Anion Gap 06/10/2024 18         Assessment/Plan   1.  Hypertension.  Patient is a very pleasant 38-year-old white male who was diagnosed with hypertension during his  initial primary care visit in 6/2024.  He was started on amlodipine 5 mg daily at that time and the dose was subsequently increased to 10 mg daily.  Lab work performed on 6/10/2024 included a normal CBC SMA panel TSH 2.70.  Additional lab work included uric acid 5.0 sedimentation rate 4 CRP less than 0.30 CAROLEE negative rheumatoid factor less than 10.  The patient's medical history is negative for diabetes and smoking.  Family history negative for known heart disease.  His lipid status is uncertain.  Clinically he feels well other than some issues with anxiety.  He had 1 episode of minor palpitations.  Currently he is feeling relatively well.  His EKG from office visit 7/23/2024 shows sinus rhythm is unremarkable.  Blood pressure currently 138/80 indicating adequate control with the amlodipine 10 mg daily.  Patient will be advised to remain on current therapy along with some salt restriction with his diet.  He will return in 4 months to reassess his blood pressure.  He was instructed to purchase a home blood pressure monitor and to record readings and bring the machine in for next visit.  The patient will be instructed to have a fasting lipid panel drawn also before his next visit.  Blood pressure remains acceptable on amlodipine 10 mg daily.  Patient without any concerning symptoms not aware of palpitations.  Recently has not been checking home blood pressures.  2.  Minimal hyperlipidemia.  Recent lipid panel includes cholesterol 205  HDL 51 triglyceride 69.  No statin therapy at this time the patient will be advised to have a CT coronary calcium score at the age of 43.          Orders:  No orders of the defined types were placed in this encounter.     Followup Appts:  Future Appointments   Date Time Provider Department Center   11/13/2024  1:15 PM Eligio Hobbs MD [id number]           ____________________________________________________________  Eligio Hobbs MD  [facility]  [place]  Assistant Clinical Professor, [facility]

## 2025-04-10 ENCOUNTER — APPOINTMENT (OUTPATIENT)
Dept: PRIMARY CARE | Facility: CLINIC | Age: 40
End: 2025-04-10
Payer: COMMERCIAL

## 2025-04-25 ENCOUNTER — APPOINTMENT (OUTPATIENT)
Dept: PRIMARY CARE | Facility: CLINIC | Age: 40
End: 2025-04-25
Payer: COMMERCIAL

## 2025-05-04 NOTE — PROGRESS NOTES
Subjective   Aldair Lai is a 39 y.o. male who presents for  HTN and BP CHECK .    HPI:      39 y.o. male who presents for  HTN and BP CHECK .     EMR/EPIC records reviewed.     Last seen by me on 6/20/24 for  HTN and BP CHECK (PCP: Dr. Reyna). At visit:     HTN:  uncontrolled, asymptomatic   -low salt diet, regularly exercise, and limit alcohol intake  -increase amlodipine from 5 mg to 10 mg daily in the morning  -Emergency Room as 911/EMS precautions discussed and reviewed     RIGHT Knee pain: no red flag signs/sxs: ESR, CRP, uric acid, CAROLEE, RF WNL 6/10/24. r/o structural and rheumatologic disorder  -RIGHT knee XR ordered 6/6/24  -referral to PT ordered 6/6/24  -referral to orthopedic surgery ordered 6/6/24  -emergency Dept precautions discussed and reviewed with patient     Intermittent heart palpitations: no red flag symptoms. CBC, CMP, TSH WNL 6/10/24. R/o cardiac etiology  -referral to cardiology ordered 6/6/24; has scheduled cardiology appt  -Emergency Room as 911/EMS precautions discussed and reviewed        Immunization counseling:  -TDAP now due: declined     FOLLOW UP : 1 week for BP check and Dr. Reyna in 6-8 weeks         PMHx:  HTN; on amlodipine 10 mg daily     Immunizations:  -TDAP: NOW DUE    Immunization History   Administered Date(s) Administered    COVID-19, mRNA, LNP-S, PF, 30 mcg/0.3 mL dose 02/11/2021, 03/17/2021, 11/12/2021              INTERVAL HISTORY:     -has not yet completed RIGHT knee xray    - Saw cardiology Dr. Hobbs for hypertension and to continue amlodipine 10 mg daily        Today  Aldair reports:      -left wrist lump and pain x 2-3 weeks, increasing in size over time    -intermittent heart palpitations and rapid heart racing heart  twice in the past 9 months, with no new episodes since last visit. He denies chest pain, N/V, SOB, cough, exertional dyspnea, LE swelling, family history heart attack or stroke.  Follows with cardiology who recommended no additional cardiac  testing.       -  RESOLVED right knee pain.     -otherwise doing and feeling well.      -taking all medications as prescribed, including amlodipine 10 mg daily, with no reported adverse medication side effects     -following a low salt diet        Today he has no other reported complaints, issues, or problems.     ROS is NEG for HEADACHE, NAUSEA, VOMITING, DIARRHEA, CHEST PAIN, SOB, and BLEEDING.  Review of systems (12 point) is negative for all systems except for any identified issues in HPI above.       Objective     /68   Pulse 80   Temp 36.3 °C (97.3 °F)   Wt 79.4 kg (175 lb)   SpO2 98%   BMI 23.09 kg/m²      Physical Examination:       GENERAL           General Appearance: well-appearing, well-developed, well-hydrated, well-nourished, no acute distress.        HEENT           NECK supple, no masses or thyromegaly, no carotid bruit.        EYES           Extraocular Movements: normal, bilateral eyes YOLETTE, no conjunctival injection.        HEART           Rate and Rhythm regular rate and rhythm. Heart sounds: normal S1S2, no S3 or S4. Murmurs: none.        CHEST           Breath sounds: Clear to IPPA, RR<16 no use of accessory muscles.        ABDOMEN           General: Neg for LKKS or masses, no scleral icterus or jaundice.        MUSCULOSKELETAL           Joints Demonstration: Neg for erythema, swelling or joint deformities. gross abnormalities no gross abnormalities.  LEFT WRIST: + lump on right wrist, non-tender to palpation most consistent with ganglion cyst       EXTREMITIES           Lower Extremities: Neg for cyanosis, clubbing or edema.       Assessment/Plan   Problem List Items Addressed This Visit       Primary hypertension - Primary    Relevant Orders    Comprehensive metabolic panel    Urinalysis with Reflex Microscopic     Other Visit Diagnoses         Heart palpitations          Knee pain, unspecified chronicity, unspecified laterality          Immunization counseling          Ganglion  cyst        Relevant Orders    Adult Referral to Orthopedics and Sports Medicine      Left wrist pain        Relevant Orders    XR wrist left 3+ views          HTN:    -CMP, UA ordered  -low salt diet, regularly exercise, and limit alcohol intake  -cont amlodipine from 10 mg daily in the morning  -Emergency Room and 911/EMS precautions discussed and reviewed with patient    LEFT wrist ganglion cyst: no red flag signs/sxs  -referral to orthopedic surgery  -xray left wrist ordered         RIGHT Knee pain==> RESOLVED: no red flag signs/sxs: ESR, CRP, uric acid, CAROLEE, RF WNL 6/10/24. r/o structural and rheumatologic disorder  -RIGHT knee XR ordered 6/6/24; patient advised to complete right knee x-ray  -referral to PT ordered 6/6/24;  -referral to orthopedic surgery ordered 6/6/24  -emergency Dept precautions discussed and reviewed with patient     Intermittent heart palpitations: no red flag symptoms. CBC, CMP, TSH WNL 6/10/24.  Followed by cardiology   - Continue management by cardiology  -Emergency Room and 911/EMS precautions discussed and reviewed    Immunization counseling:  -TDAP now due: declined  -Recommended updated COVID-vaccine that can be obtained at local pharmacy     FOLLOW UP : 4 weeks for annual physical exam       I have personally reviewed all available pertinent labs, imaging, and consult notes with the patient.     Discussed recommended plan of care with patient. Patient expressed understanding and agreement with plan of care. All of patient's  questions were answered at the time. Patient had no additional questions at the time.       Payton Forde MD, PhD

## 2025-05-05 PROBLEM — R03.0 FINDING OF ABOVE NORMAL BLOOD PRESSURE: Status: ACTIVE | Noted: 2025-05-05

## 2025-05-06 ENCOUNTER — OFFICE VISIT (OUTPATIENT)
Dept: CARDIOLOGY | Facility: CLINIC | Age: 40
End: 2025-05-06
Payer: COMMERCIAL

## 2025-05-06 VITALS
HEART RATE: 98 BPM | DIASTOLIC BLOOD PRESSURE: 88 MMHG | SYSTOLIC BLOOD PRESSURE: 155 MMHG | BODY MASS INDEX: 23.23 KG/M2 | OXYGEN SATURATION: 99 % | HEIGHT: 73 IN | WEIGHT: 175.3 LBS

## 2025-05-06 DIAGNOSIS — E78.5 HYPERLIPIDEMIA, UNSPECIFIED HYPERLIPIDEMIA TYPE: Primary | ICD-10-CM

## 2025-05-06 DIAGNOSIS — I10 PRIMARY HYPERTENSION: ICD-10-CM

## 2025-05-06 PROCEDURE — 3079F DIAST BP 80-89 MM HG: CPT | Performed by: NURSE PRACTITIONER

## 2025-05-06 PROCEDURE — 3008F BODY MASS INDEX DOCD: CPT | Performed by: NURSE PRACTITIONER

## 2025-05-06 PROCEDURE — 99214 OFFICE O/P EST MOD 30 MIN: CPT | Performed by: NURSE PRACTITIONER

## 2025-05-06 PROCEDURE — 3077F SYST BP >= 140 MM HG: CPT | Performed by: NURSE PRACTITIONER

## 2025-05-06 PROCEDURE — 1036F TOBACCO NON-USER: CPT | Performed by: NURSE PRACTITIONER

## 2025-05-06 RX ORDER — AMLODIPINE BESYLATE 10 MG/1
10 TABLET ORAL DAILY
Qty: 90 TABLET | Refills: 3 | Status: SHIPPED | OUTPATIENT
Start: 2025-05-06 | End: 2026-05-06

## 2025-05-06 ASSESSMENT — PAIN SCALES - GENERAL: PAINLEVEL_OUTOF10: 0-NO PAIN

## 2025-05-06 ASSESSMENT — PATIENT HEALTH QUESTIONNAIRE - PHQ9
SUM OF ALL RESPONSES TO PHQ9 QUESTIONS 1 AND 2: 0
2. FEELING DOWN, DEPRESSED OR HOPELESS: NOT AT ALL
1. LITTLE INTEREST OR PLEASURE IN DOING THINGS: NOT AT ALL

## 2025-05-06 ASSESSMENT — ENCOUNTER SYMPTOMS
OCCASIONAL FEELINGS OF UNSTEADINESS: 0
LOSS OF SENSATION IN FEET: 0
MUSCULOSKELETAL NEGATIVE: 1
GASTROINTESTINAL NEGATIVE: 1
NEUROLOGICAL NEGATIVE: 1
CARDIOVASCULAR NEGATIVE: 1
CONSTITUTIONAL NEGATIVE: 1
RESPIRATORY NEGATIVE: 1
DEPRESSION: 0

## 2025-05-06 NOTE — PROGRESS NOTES
"Chief Complaint:   Follow-up hypertension    History Of Present Illness:    .Mr Lai returns in follow up.  Denies chest pain, sob, palpitations or pedal edema.  Per Dr Hobbs, will hold off on further studies for now.           Last Recorded Vitals:  Blood pressure 155/88, pulse 98, height 1.854 m (6' 1\"), weight 79.5 kg (175 lb 4.8 oz), SpO2 99%.     Past Medical History:  Medical History[1]     Past Surgical History:  Surgical History[2]    Social History:  Social History[3]    Family History:  Family History[4]      Allergies:  Patient has no known allergies.    Outpatient Medications:  Current Medications[5]     Physical Exam:  Cardiovascular:      PMI at left midclavicular line. Normal rate. Regular rhythm. Normal S1. Normal S2.       Murmurs: There is no murmur.      No gallop.  No click. No rub.   Pulses:     Intact distal pulses.   Edema:     Peripheral edema absent.         ROS:  Review of Systems   Constitutional: Negative.   Cardiovascular: Negative.    Respiratory: Negative.     Skin: Negative.    Musculoskeletal: Negative.    Gastrointestinal: Negative.    Genitourinary: Negative.    Neurological: Negative.           Last Labs: reviewed  CBC -  Lab Results   Component Value Date    WBC 8.5 06/10/2024    HGB 14.0 06/10/2024    HCT 42.3 06/10/2024    MCV 87 06/10/2024     06/10/2024       CMP -  Lab Results   Component Value Date    CALCIUM 9.8 06/10/2024    PROT 7.5 06/10/2024    ALBUMIN 4.8 06/10/2024    AST 15 06/10/2024    ALT 14 06/10/2024    ALKPHOS 64 06/10/2024    BILITOT 1.1 06/10/2024       LIPID PANEL -   Lab Results   Component Value Date    CHOL 205 (H) 09/18/2024    TRIG 69 09/18/2024    HDL 51.6 09/18/2024    CHHDL 4.0 09/18/2024    VLDL 14 09/18/2024    NHDL 153 (H) 09/18/2024       RENAL FUNCTION PANEL -   Lab Results   Component Value Date    GLUCOSE 90 06/10/2024     06/10/2024    K 4.0 06/10/2024     06/10/2024    CO2 23 (L) 06/10/2024    ANIONGAP 18 06/10/2024 " "   BUN 11 06/10/2024    CREATININE 0.90 06/10/2024    CALCIUM 9.8 06/10/2024    ALBUMIN 4.8 06/10/2024        No results found for: \"BNP\", \"HGBA1C\"      Assessment/Plan   Problem List Items Addressed This Visit    None    1.  Hypertension.  Patient is a very pleasant 38-year-old white male who was diagnosed with hypertension during his initial primary care visit in 6/2024.  He was started on amlodipine 5 mg daily at that time and the dose was subsequently increased to 10 mg daily.  Lab work performed on 6/10/2024 included a normal CBC SMA panel TSH 2.70.  Additional lab work included uric acid 5.0 sedimentation rate 4 CRP less than 0.30 CAROLEE negative rheumatoid factor less than 10.  The patient's medical history is negative for diabetes and smoking.  Family history negative for known heart disease.  His lipid status is uncertain.  Clinically he feels well other than some issues with anxiety.  He had 1 episode of minor palpitations.  Currently he is feeling relatively well.  His EKG from office visit 7/23/2024 shows sinus rhythm is unremarkable.  Blood pressure currently 155/88, but per Dr Hobbs, will continue only with the amlodipine 10 mg daily.  Patient will be advised to remain on current therapy along with some salt restriction with his diet.  He will return in 9 months to reassess his blood pressure.  He was instructed to purchase a home blood pressure monitor and to record readings and bring the machine in for next visit.    2.  Minimal hyperlipidemia. 09/2024 lipid panel includes cholesterol 205  HDL 51 triglyceride 69.  No statin therapy at this time the patient will be advised to have a CT coronary calcium score at the age of 43.         Riya Alberts, APRN-CNP       [1] History reviewed. No pertinent past medical history.  [2] History reviewed. No pertinent surgical history.  [3]   Social History  Socioeconomic History    Marital status:    Tobacco Use    Smoking status: Never     Passive " exposure: Never    Smokeless tobacco: Never   Substance and Sexual Activity    Alcohol use: Never    Drug use: Yes     Comment: CBD daily    Sexual activity: Yes     Partners: Female     Birth control/protection: None   [4] No family history on file.  [5]   Current Outpatient Medications   Medication Sig Dispense Refill    amLODIPine (Norvasc) 10 mg tablet Take 1 tablet (10 mg) by mouth once daily. 30 tablet 11    omeprazole OTC (PriLOSEC OTC) 20 mg EC tablet Take 2 tablets (40 mg) by mouth once daily in the morning. Take before meals.      amLODIPine (Norvasc) 5 mg tablet Take 1 tablet (5 mg) by mouth once daily. (Patient not taking: Reported on 5/6/2025) 30 tablet 11     No current facility-administered medications for this visit.

## 2025-05-07 ENCOUNTER — OFFICE VISIT (OUTPATIENT)
Dept: PRIMARY CARE | Facility: CLINIC | Age: 40
End: 2025-05-07
Payer: COMMERCIAL

## 2025-05-07 VITALS
OXYGEN SATURATION: 98 % | WEIGHT: 175 LBS | TEMPERATURE: 97.3 F | BODY MASS INDEX: 23.09 KG/M2 | HEART RATE: 80 BPM | SYSTOLIC BLOOD PRESSURE: 134 MMHG | DIASTOLIC BLOOD PRESSURE: 68 MMHG

## 2025-05-07 DIAGNOSIS — M25.569 KNEE PAIN, UNSPECIFIED CHRONICITY, UNSPECIFIED LATERALITY: ICD-10-CM

## 2025-05-07 DIAGNOSIS — I10 PRIMARY HYPERTENSION: Primary | ICD-10-CM

## 2025-05-07 DIAGNOSIS — M25.532 LEFT WRIST PAIN: ICD-10-CM

## 2025-05-07 DIAGNOSIS — M67.40 GANGLION CYST: ICD-10-CM

## 2025-05-07 DIAGNOSIS — Z71.85 IMMUNIZATION COUNSELING: ICD-10-CM

## 2025-05-07 DIAGNOSIS — R00.2 HEART PALPITATIONS: ICD-10-CM

## 2025-05-07 PROCEDURE — 1036F TOBACCO NON-USER: CPT | Performed by: FAMILY MEDICINE

## 2025-05-07 PROCEDURE — 3078F DIAST BP <80 MM HG: CPT | Performed by: FAMILY MEDICINE

## 2025-05-07 PROCEDURE — 99214 OFFICE O/P EST MOD 30 MIN: CPT | Performed by: FAMILY MEDICINE

## 2025-05-07 PROCEDURE — 3075F SYST BP GE 130 - 139MM HG: CPT | Performed by: FAMILY MEDICINE

## 2025-05-07 ASSESSMENT — COLUMBIA-SUICIDE SEVERITY RATING SCALE - C-SSRS
1. IN THE PAST MONTH, HAVE YOU WISHED YOU WERE DEAD OR WISHED YOU COULD GO TO SLEEP AND NOT WAKE UP?: NO
6. HAVE YOU EVER DONE ANYTHING, STARTED TO DO ANYTHING, OR PREPARED TO DO ANYTHING TO END YOUR LIFE?: NO
2. HAVE YOU ACTUALLY HAD ANY THOUGHTS OF KILLING YOURSELF?: NO

## 2025-05-07 ASSESSMENT — PATIENT HEALTH QUESTIONNAIRE - PHQ9
SUM OF ALL RESPONSES TO PHQ9 QUESTIONS 1 AND 2: 0
1. LITTLE INTEREST OR PLEASURE IN DOING THINGS: NOT AT ALL
2. FEELING DOWN, DEPRESSED OR HOPELESS: NOT AT ALL

## 2025-05-07 ASSESSMENT — PAIN SCALES - GENERAL: PAINLEVEL_OUTOF10: 2

## 2025-05-09 ENCOUNTER — HOSPITAL ENCOUNTER (OUTPATIENT)
Dept: RADIOLOGY | Facility: CLINIC | Age: 40
Discharge: HOME | End: 2025-05-09
Payer: COMMERCIAL

## 2025-05-09 ENCOUNTER — OFFICE VISIT (OUTPATIENT)
Dept: ORTHOPEDIC SURGERY | Facility: CLINIC | Age: 40
End: 2025-05-09
Payer: COMMERCIAL

## 2025-05-09 VITALS — HEIGHT: 73 IN | WEIGHT: 175 LBS | BODY MASS INDEX: 23.19 KG/M2

## 2025-05-09 DIAGNOSIS — M25.842 CYST OF JOINT OF HAND, LEFT: ICD-10-CM

## 2025-05-09 DIAGNOSIS — M25.842 CYST OF JOINT OF HAND, LEFT: Primary | ICD-10-CM

## 2025-05-09 DIAGNOSIS — M67.40 GANGLION CYST: ICD-10-CM

## 2025-05-09 PROCEDURE — 3008F BODY MASS INDEX DOCD: CPT | Performed by: STUDENT IN AN ORGANIZED HEALTH CARE EDUCATION/TRAINING PROGRAM

## 2025-05-09 PROCEDURE — 1036F TOBACCO NON-USER: CPT | Performed by: STUDENT IN AN ORGANIZED HEALTH CARE EDUCATION/TRAINING PROGRAM

## 2025-05-09 PROCEDURE — 99204 OFFICE O/P NEW MOD 45 MIN: CPT | Performed by: STUDENT IN AN ORGANIZED HEALTH CARE EDUCATION/TRAINING PROGRAM

## 2025-05-09 PROCEDURE — 99214 OFFICE O/P EST MOD 30 MIN: CPT | Performed by: STUDENT IN AN ORGANIZED HEALTH CARE EDUCATION/TRAINING PROGRAM

## 2025-05-09 PROCEDURE — 73110 X-RAY EXAM OF WRIST: CPT | Mod: LT

## 2025-05-09 ASSESSMENT — PAIN - FUNCTIONAL ASSESSMENT: PAIN_FUNCTIONAL_ASSESSMENT: NO/DENIES PAIN

## 2025-05-09 NOTE — PROGRESS NOTES
CHIEF COMPLAINT: Left wrist soft tissue mass   DOI:none  DOS:none      HISTORY OF PRESENT ILLNESS    This is a very pleasant 39-year-old male, LHD, works as a , lives with wife, denies active tobacco use, no Dx of DM, no AC use, no sig CV/pulm hx. No prior UE surgeries.     He is presenting as new patient evaluation for spontaneous appearing left volar radial wrist mass.  He denies any inciting event or any trauma whatsoever.  He reports about 2 weeks ago woke up 1 day and noticed a large soft tissue mass.  It has been stable in size since it has been present.  Does not cause him any pain.  Denies any associated numbness tingling or paresthesias.  Denies any other known masses.    PHYSICAL EXAM    Extremities / Musculoskeletal:                  Left upper extremity:  Obvious soft tissue mass about the volar radial aspect of the wrist.  This is ellipsoid approximately 2.5 x 1 cm.  There are no overlying skin changes.  Negative Tinel's, positive transillumination, this is located directly over the radial artery.  It appears pulsatile however it is difficult to determine if it is truly pulsatile or just the referred pulsation from the underlying artery.  He may  Yayo's test was performed.  There is robust collateral circulation from the ulnar artery with the thumb index middle and ring and small fingers very well-perfused with ulnar artery only flow.  This is rapid less than 5 seconds.  When performing the Yayo test release of the radial artery there is sluggish return of pink well-perfused fingertips for approximately 15 seconds.  Full active wrist range of motion 90 degrees of flexion 90 degrees extension 90 degrees of pronation 90 degrees of supination.  FPL EPL intact.  FDS FDP intact index or small finger. Motor intact to radian/PIN/median/AIN/Ulnar nerve distributions. Sensation intact to light touch in the median/ulnar/radial nerve distributions. 2+ radial pulse at the wrist, hand and all  digits are warm and well-perfused with a brisk capillary refill of <2s.         IMAGING / LABS / EMGs:    Left wrist x-ray series obtained on 5/9/2025 independently reviewed demonstrating stable global alignment.  No obvious acute fractures or dislocations.  Well-maintained joint spaces    Medical History[1]    Medication Documentation Review Audit       Reviewed by Chasity Delgado LPN (Licensed Nurse) on 05/09/25 at 1103      Medication Order Taking? Sig Documenting Provider Last Dose Status     Discontinued 05/06/25 1336   amLODIPine (Norvasc) 10 mg tablet [id number]  Take 1 tablet (10 mg) by mouth once daily. Riya Alberts APRN-CNP  Active   Patient not taking:  Discontinued 05/06/25 1336   omeprazole OTC (PriLOSEC OTC) 20 mg EC tablet [id number] No Take 2 tablets (40 mg) by mouth once daily in the morning. Take before meals. Historical Provider, MD Taking Active                    RX Allergies[2]    Surgical History[3]      ASSESSMENT:   Left volar radial soft tissue mass.  Unclear diagnosis at this time, small ganglion cyst is certainly in the differential diagnosis I am concerned that this may be related to his radial artery.  He had a sudden onset approximately 2 weeks ago and the mass is pulsatile.  I would like to obtain advanced imaging in the form of a MRI with and without contrast to better evaluate this lesion.    We discussed depending what the MRI shows if it is not bothering him at all we could continue to observe this.  We briefly talked about possible surgical interventions including mass excision, ligation of the radial artery, versus reconstruction.    PLAN:   Stat MRI left wrist with and without contrast ordered    Follow-up in: After completion of the MRI  XR at next visit: none    The diagnosis and treatment plan were reviewed with the patient. All questions were answered. The patient verbalized understanding of the treatment plan. There were no barriers to understanding identified.      Note dictated with [employer] software.  Completed without full type editing and sent to avoid delay.    Maximiliano Forrester M.D.    Department of Orthopaedics  Hand/Upper Extremity  Phone: [phone]  Appt. Phone: [phone]\         [1] No past medical history on file.  [2] No Known Allergies  [3] No past surgical history on file.

## 2025-05-12 ENCOUNTER — HOSPITAL ENCOUNTER (OUTPATIENT)
Dept: RADIOLOGY | Facility: HOSPITAL | Age: 40
Discharge: HOME | End: 2025-05-12
Payer: COMMERCIAL

## 2025-05-12 DIAGNOSIS — M25.842 CYST OF JOINT OF HAND, LEFT: ICD-10-CM

## 2025-05-12 PROCEDURE — 2550000001 HC RX 255 CONTRASTS: Mod: JZ | Performed by: STUDENT IN AN ORGANIZED HEALTH CARE EDUCATION/TRAINING PROGRAM

## 2025-05-12 PROCEDURE — A9575 INJ GADOTERATE MEGLUMI 0.1ML: HCPCS | Mod: JZ | Performed by: STUDENT IN AN ORGANIZED HEALTH CARE EDUCATION/TRAINING PROGRAM

## 2025-05-12 PROCEDURE — 73222 MRI JOINT UPR EXTREM W/DYE: CPT | Mod: LT

## 2025-05-12 RX ORDER — GADOTERATE MEGLUMINE 376.9 MG/ML
15 INJECTION INTRAVENOUS
Status: COMPLETED | OUTPATIENT
Start: 2025-05-12 | End: 2025-05-12

## 2025-05-12 RX ORDER — GADOTERATE MEGLUMINE 376.9 MG/ML
19 INJECTION INTRAVENOUS
Status: DISCONTINUED | OUTPATIENT
Start: 2025-05-12 | End: 2025-05-12

## 2025-05-12 RX ADMIN — GADOTERATE MEGLUMINE 15 ML: 376.9 INJECTION INTRAVENOUS at 14:48

## 2025-05-13 ENCOUNTER — APPOINTMENT (OUTPATIENT)
Dept: PRIMARY CARE | Facility: CLINIC | Age: 40
End: 2025-05-13
Payer: COMMERCIAL

## 2025-05-15 ENCOUNTER — APPOINTMENT (OUTPATIENT)
Dept: ORTHOPEDIC SURGERY | Facility: CLINIC | Age: 40
End: 2025-05-15
Payer: COMMERCIAL

## 2025-05-15 VITALS — HEIGHT: 73 IN | WEIGHT: 175 LBS | BODY MASS INDEX: 23.19 KG/M2

## 2025-05-15 DIAGNOSIS — M25.842 CYST OF JOINT OF HAND, LEFT: Primary | ICD-10-CM

## 2025-05-15 PROCEDURE — 1036F TOBACCO NON-USER: CPT | Performed by: STUDENT IN AN ORGANIZED HEALTH CARE EDUCATION/TRAINING PROGRAM

## 2025-05-15 PROCEDURE — 99213 OFFICE O/P EST LOW 20 MIN: CPT | Performed by: STUDENT IN AN ORGANIZED HEALTH CARE EDUCATION/TRAINING PROGRAM

## 2025-05-15 PROCEDURE — 3008F BODY MASS INDEX DOCD: CPT | Performed by: STUDENT IN AN ORGANIZED HEALTH CARE EDUCATION/TRAINING PROGRAM

## 2025-05-15 NOTE — PROGRESS NOTES
CHIEF COMPLAINT: Left wrist soft tissue mass   DOI:none  DOS:none      HISTORY OF PRESENT ILLNESS    This is a very pleasant 39-year-old male, LHD, works as a , lives with wife, denies active tobacco use, no Dx of DM, no AC use, no sig CV/pulm hx. No prior UE surgeries.     He is presenting as new patient evaluation for spontaneous appearing left volar radial wrist mass.  He denies any inciting event or any trauma whatsoever.  He reports about 2 weeks ago woke up 1 day and noticed a large soft tissue mass.  It has been stable in size since it has been present.  Does not cause him any pain.  Denies any associated numbness tingling or paresthesias.  Denies any other known masses.    Interval update 5/15/2025:  Patient returning to discuss results of his MRI.  He denies any significant changes in his symptoms.  Denies any visible changes in the mass.    PHYSICAL EXAM    Extremities / Musculoskeletal:                  Left upper extremity:  Obvious soft tissue mass about the volar radial aspect of the wrist.  This is ellipsoid approximately 2.5 x 1 cm.  There are no overlying skin changes.  Negative Tinel's, positive transillumination, this is located directly over the radial artery.  It appears pulsatile however it is difficult to determine if it is truly pulsatile or just the referred pulsation from the underlying artery.  He may  Yayo's test was performed.  There is robust collateral circulation from the ulnar artery with the thumb index middle and ring and small fingers very well-perfused with ulnar artery only flow.  This is rapid less than 5 seconds.  When performing the Yayo test release of the radial artery there is sluggish return of pink well-perfused fingertips for approximately 15 seconds.  Full active wrist range of motion 90 degrees of flexion 90 degrees extension 90 degrees of pronation 90 degrees of supination.  FPL EPL intact.  FDS FDP intact index or small finger. Motor intact to  radian/PIN/median/AIN/Ulnar nerve distributions. Sensation intact to light touch in the median/ulnar/radial nerve distributions. 2+ radial pulse at the wrist, hand and all digits are warm and well-perfused with a brisk capillary refill of <2s.         IMAGING / LABS / EMGs:    Left wrist x-ray series obtained on 5/9/2025 independently reviewed demonstrating stable global alignment.  No obvious acute fractures or dislocations.  Well-maintained joint spaces    MRI left wrist with and without IV contrast performed on 5/12/2025:  IMPRESSION:  2.3 x 1.6 x 1.6 cm cystic lesion of the radial aspect of the carpus  as above. The imaging findings favor ganglion cyst as there are no  blood products or pulsation but the lesion is in contact with the  radial artery along a long portion of its course.    Medical History[1]    Medication Documentation Review Audit       Reviewed by Chasity Delgado LPN (Licensed Nurse) on 05/15/25 at 1318      Medication Order Taking? Sig Documenting Provider Last Dose Status   amLODIPine (Norvasc) 10 mg tablet [id number]  Take 1 tablet (10 mg) by mouth once daily. Riya Alberts, APRN-CNP  Active   omeprazole OTC (PriLOSEC OTC) 20 mg EC tablet [id number] No Take 2 tablets (40 mg) by mouth once daily in the morning. Take before meals. Historical Provider, MD Taking Active                    RX Allergies[2]    Surgical History[3]      ASSESSMENT:   Left volar radial soft tissue mass.  Unclear diagnosis at this time, small ganglion cyst is certainly in the differential diagnosis I am concerned that this may be related to his radial artery.  He had a sudden onset approximately 2 weeks ago and the mass is pulsatile.  I would like to obtain advanced imaging in the form of a MRI with and without contrast to better evaluate this lesion.    We discussed depending what the MRI shows if it is not bothering him at all we could continue to observe this.  We briefly talked about possible surgical  interventions including mass excision, ligation of the radial artery, versus reconstruction.    Interval update 5/15/2025:  MRI reassuring against an ominous pathology.  Formal read by the [facility] radiologist less concerned about a vascular lesion and more consistent with a ganglion cyst.    We had a long conversation about treatment options at this point.  We discussed deliberate observation with no intervention.  I would request seeing him at least at 3-month intervals at first just to ensure that there are no concerning changes to this.  I asked if we do this study notify me if he notes any skin changes or changes in size of the lesion.    We also discussed a surgical excision.  We discussed that the big concern at this is the risk of recurrence.  While it is not appear to be a radial artery pseudoaneurysm on the MRI, we have to be prepared that it does not involve the radial artery or if the radial artery gets injured how to proceed.  Fortunately he has a very reassuring Yayo test that he has excellent ulnar dominant perfusion throughout his hand including his distal thumb with ulnar artery only perfusion.  In this setting I would recommend a simple radial artery ligation alone.  If at the end of the case with the tourniquet down the perfusion of his fingers is concerned in any way we would have to consider a venous interposition.    PLAN:   Continue to monitor, activity as tolerated, consider removable over-the-counter wrist brace    Follow-up in: 3 months  XR at next visit: none    The diagnosis and treatment plan were reviewed with the patient. All questions were answered. The patient verbalized understanding of the treatment plan. There were no barriers to understanding identified.     Note dictated with [employer] software.  Completed without full type editing and sent to avoid delay.    Maximiliano Forrester M.D.    Department of Orthopaedics  Hand/Upper Extremity  Phone: [phone]  Appt. Phone: [phone]\         [1] No past medical history on file.  [2] No Known Allergies  [3] No past surgical history on file.

## 2025-08-15 ENCOUNTER — APPOINTMENT (OUTPATIENT)
Dept: ORTHOPEDIC SURGERY | Facility: CLINIC | Age: 40
End: 2025-08-15
Payer: COMMERCIAL

## 2025-08-21 ENCOUNTER — APPOINTMENT (OUTPATIENT)
Dept: ORTHOPEDIC SURGERY | Facility: CLINIC | Age: 40
End: 2025-08-21
Payer: COMMERCIAL

## 2025-08-21 VITALS — HEIGHT: 73 IN | WEIGHT: 175 LBS | BODY MASS INDEX: 23.19 KG/M2

## 2025-08-21 DIAGNOSIS — M25.842 CYST OF JOINT OF HAND, LEFT: Primary | ICD-10-CM

## 2025-08-21 PROCEDURE — 99212 OFFICE O/P EST SF 10 MIN: CPT

## 2025-08-21 PROCEDURE — 99213 OFFICE O/P EST LOW 20 MIN: CPT | Performed by: STUDENT IN AN ORGANIZED HEALTH CARE EDUCATION/TRAINING PROGRAM

## 2025-08-21 PROCEDURE — 3008F BODY MASS INDEX DOCD: CPT | Performed by: STUDENT IN AN ORGANIZED HEALTH CARE EDUCATION/TRAINING PROGRAM
